# Patient Record
Sex: FEMALE | Race: WHITE | NOT HISPANIC OR LATINO
[De-identification: names, ages, dates, MRNs, and addresses within clinical notes are randomized per-mention and may not be internally consistent; named-entity substitution may affect disease eponyms.]

---

## 2017-06-27 ENCOUNTER — NON-APPOINTMENT (OUTPATIENT)
Age: 22
End: 2017-06-27

## 2017-06-27 ENCOUNTER — APPOINTMENT (OUTPATIENT)
Dept: CARDIOLOGY | Facility: CLINIC | Age: 22
End: 2017-06-27

## 2017-06-27 VITALS
OXYGEN SATURATION: 100 % | SYSTOLIC BLOOD PRESSURE: 141 MMHG | HEART RATE: 73 BPM | WEIGHT: 231 LBS | BODY MASS INDEX: 34.21 KG/M2 | DIASTOLIC BLOOD PRESSURE: 86 MMHG | HEIGHT: 69 IN

## 2017-06-27 DIAGNOSIS — Z84.89 FAMILY HISTORY OF OTHER SPECIFIED CONDITIONS: ICD-10-CM

## 2017-07-06 ENCOUNTER — APPOINTMENT (OUTPATIENT)
Dept: CARDIOLOGY | Facility: CLINIC | Age: 22
End: 2017-07-06

## 2017-07-06 ENCOUNTER — MEDICATION RENEWAL (OUTPATIENT)
Age: 22
End: 2017-07-06

## 2017-07-18 ENCOUNTER — APPOINTMENT (OUTPATIENT)
Dept: CARDIOLOGY | Facility: CLINIC | Age: 22
End: 2017-07-18

## 2017-07-28 ENCOUNTER — OTHER (OUTPATIENT)
Age: 22
End: 2017-07-28

## 2017-07-28 DIAGNOSIS — R00.2 PALPITATIONS: ICD-10-CM

## 2017-08-18 ENCOUNTER — APPOINTMENT (OUTPATIENT)
Dept: OBGYN | Facility: CLINIC | Age: 22
End: 2017-08-18
Payer: COMMERCIAL

## 2017-08-18 VITALS
WEIGHT: 234 LBS | SYSTOLIC BLOOD PRESSURE: 126 MMHG | DIASTOLIC BLOOD PRESSURE: 80 MMHG | BODY MASS INDEX: 34.66 KG/M2 | HEIGHT: 69 IN

## 2017-08-18 PROCEDURE — 99395 PREV VISIT EST AGE 18-39: CPT

## 2017-08-28 LAB
C TRACH RRNA SPEC QL NAA+PROBE: NORMAL
CYTOLOGY CVX/VAG DOC THIN PREP: NORMAL
N GONORRHOEA RRNA SPEC QL NAA+PROBE: NORMAL
SOURCE TP AMPLIFICATION: NORMAL

## 2018-08-21 ENCOUNTER — APPOINTMENT (OUTPATIENT)
Dept: OBGYN | Facility: CLINIC | Age: 23
End: 2018-08-21
Payer: COMMERCIAL

## 2018-08-21 VITALS
BODY MASS INDEX: 34.66 KG/M2 | HEIGHT: 69 IN | DIASTOLIC BLOOD PRESSURE: 60 MMHG | SYSTOLIC BLOOD PRESSURE: 120 MMHG | WEIGHT: 234 LBS

## 2018-08-21 DIAGNOSIS — Z00.00 ENCOUNTER FOR GENERAL ADULT MEDICAL EXAMINATION W/OUT ABNORMAL FINDINGS: ICD-10-CM

## 2018-08-21 PROCEDURE — 99395 PREV VISIT EST AGE 18-39: CPT

## 2018-08-24 LAB
C TRACH RRNA SPEC QL NAA+PROBE: NOT DETECTED
CYTOLOGY CVX/VAG DOC THIN PREP: NORMAL
N GONORRHOEA RRNA SPEC QL NAA+PROBE: NOT DETECTED
SOURCE TP AMPLIFICATION: NORMAL

## 2019-09-10 ENCOUNTER — APPOINTMENT (OUTPATIENT)
Dept: OBGYN | Facility: CLINIC | Age: 24
End: 2019-09-10
Payer: COMMERCIAL

## 2019-09-10 VITALS
WEIGHT: 180 LBS | HEIGHT: 69 IN | DIASTOLIC BLOOD PRESSURE: 70 MMHG | SYSTOLIC BLOOD PRESSURE: 120 MMHG | BODY MASS INDEX: 26.66 KG/M2

## 2019-09-10 DIAGNOSIS — N94.6 DYSMENORRHEA, UNSPECIFIED: ICD-10-CM

## 2019-09-10 PROCEDURE — 99395 PREV VISIT EST AGE 18-39: CPT

## 2019-09-10 RX ORDER — NORGESTIMATE AND ETHINYL ESTRADIOL 0.25-0.035
0.25-35 KIT ORAL DAILY
Qty: 90 | Refills: 3 | Status: ACTIVE | COMMUNITY
Start: 2019-07-30 | End: 1900-01-01

## 2019-09-10 NOTE — HISTORY OF PRESENT ILLNESS
[1 Year Ago] : 1 year ago [Good] : being in good health [Regular Exercise] : regular exercise [Healthy Diet] : a healthy diet [Weight Concerns] : no concerns with her weight [Menstrual Problems] : reports normal menses [Up to Date] : not up to date with ~his/her~ STD screening [Sexually Active] : is not sexually active

## 2019-09-10 NOTE — PHYSICAL EXAM
[Awake] : awake [Alert] : alert [Acute Distress] : no acute distress [LAD] : no lymphadenopathy [Goiter] : no goiter [Thyroid Nodule] : no thyroid nodule [Mass] : no breast mass [Nipple Discharge] : no nipple discharge [Soft] : soft [Axillary LAD] : no axillary lymphadenopathy [Tender] : non tender [Oriented x3] : oriented to person, place, and time [Labia Majora] : labia major [Labia Minora] : labia minora [Normal] : clitoris [No Bleeding] : there was no active vaginal bleeding [Pap Obtained] : a Pap smear was performed [Uterine Adnexae] : were not tender and not enlarged

## 2019-09-11 ENCOUNTER — TRANSCRIPTION ENCOUNTER (OUTPATIENT)
Age: 24
End: 2019-09-11

## 2019-09-12 LAB
C TRACH RRNA SPEC QL NAA+PROBE: NOT DETECTED
N GONORRHOEA RRNA SPEC QL NAA+PROBE: NOT DETECTED
SOURCE TP AMPLIFICATION: NORMAL

## 2019-09-14 LAB — CYTOLOGY CVX/VAG DOC THIN PREP: NORMAL

## 2019-11-15 ENCOUNTER — TRANSCRIPTION ENCOUNTER (OUTPATIENT)
Age: 24
End: 2019-11-15

## 2019-12-11 ENCOUNTER — TRANSCRIPTION ENCOUNTER (OUTPATIENT)
Age: 24
End: 2019-12-11

## 2020-01-23 RX ORDER — NORGESTIMATE AND ETHINYL ESTRADIOL 0.25-0.035
0.25-35 KIT ORAL
Qty: 3 | Refills: 3 | Status: ACTIVE | COMMUNITY
Start: 2020-01-23 | End: 1900-01-01

## 2021-01-14 ENCOUNTER — APPOINTMENT (OUTPATIENT)
Dept: OBGYN | Facility: CLINIC | Age: 26
End: 2021-01-14
Payer: COMMERCIAL

## 2021-01-14 VITALS
HEIGHT: 69 IN | SYSTOLIC BLOOD PRESSURE: 126 MMHG | DIASTOLIC BLOOD PRESSURE: 80 MMHG | HEART RATE: 91 BPM | WEIGHT: 243 LBS | BODY MASS INDEX: 35.99 KG/M2

## 2021-01-14 DIAGNOSIS — Z11.3 ENCOUNTER FOR SCREENING FOR INFECTIONS WITH A PREDOMINANTLY SEXUAL MODE OF TRANSMISSION: ICD-10-CM

## 2021-01-14 DIAGNOSIS — Z01.419 ENCOUNTER FOR GYNECOLOGICAL EXAMINATION (GENERAL) (ROUTINE) W/OUT ABNORMAL FINDINGS: ICD-10-CM

## 2021-01-14 PROCEDURE — 99395 PREV VISIT EST AGE 18-39: CPT

## 2021-01-14 NOTE — HISTORY OF PRESENT ILLNESS
[FreeTextEntry1] : 26 yo here for av. She has been on micaela for 14 months and she is c/o for the past 1 month feeling lake and just not herself. She saw her pcp recently who dis full blood work,all normal. She has never been sexually active in the past. She takes this to help bring a period on. She would skip a period every other month.\par \par She had gardail vac.\par She is a teacher.

## 2021-01-14 NOTE — DISCUSSION/SUMMARY
[FreeTextEntry1] : Well woman visit\par no pap needed\par OCP  discontinued- pt would like to come off and see if her sx are due to the hormones. We can cycle with provera if needed.\par rt in 1 year\par

## 2021-01-14 NOTE — PHYSICAL EXAM
[Appropriately responsive] : appropriately responsive [Alert] : alert [No Acute Distress] : no acute distress [No Lymphadenopathy] : no lymphadenopathy [Regular Rate Rhythm] : regular rate rhythm [No Murmurs] : no murmurs [Clear to Auscultation B/L] : clear to auscultation bilaterally [Soft] : soft [Non-tender] : non-tender [Non-distended] : non-distended [No HSM] : No HSM [No Mass] : no mass [No Lesions] : no lesions [Oriented x3] : oriented x3 [Examination Of The Breasts] : a normal appearance [No Masses] : no breast masses were palpable [Labia Majora] : normal [Labia Minora] : normal [Normal] : normal [Uterine Adnexae] : normal

## 2021-02-20 ENCOUNTER — TRANSCRIPTION ENCOUNTER (OUTPATIENT)
Age: 26
End: 2021-02-20

## 2023-03-20 ENCOUNTER — APPOINTMENT (OUTPATIENT)
Dept: OBGYN | Facility: CLINIC | Age: 28
End: 2023-03-20

## 2023-05-27 ENCOUNTER — NON-APPOINTMENT (OUTPATIENT)
Age: 28
End: 2023-05-27

## 2023-10-19 ENCOUNTER — EMERGENCY (EMERGENCY)
Facility: HOSPITAL | Age: 28
LOS: 1 days | Discharge: HOME | End: 2023-10-19
Attending: EMERGENCY MEDICINE | Admitting: EMERGENCY MEDICINE
Payer: COMMERCIAL

## 2023-10-19 VITALS
WEIGHT: 229.94 LBS | DIASTOLIC BLOOD PRESSURE: 87 MMHG | HEIGHT: 69 IN | HEART RATE: 73 BPM | RESPIRATION RATE: 16 BRPM | SYSTOLIC BLOOD PRESSURE: 152 MMHG | OXYGEN SATURATION: 100 % | TEMPERATURE: 98 F

## 2023-10-19 DIAGNOSIS — M72.2 PLANTAR FASCIAL FIBROMATOSIS: ICD-10-CM

## 2023-10-19 LAB
HCG UR QL: NEGATIVE — SIGNIFICANT CHANGE UP
HCG UR QL: NEGATIVE — SIGNIFICANT CHANGE UP

## 2023-10-19 PROCEDURE — 99284 EMERGENCY DEPT VISIT MOD MDM: CPT | Mod: 25

## 2023-10-19 PROCEDURE — 81025 URINE PREGNANCY TEST: CPT

## 2023-10-19 PROCEDURE — 73630 X-RAY EXAM OF FOOT: CPT | Mod: 26,RT

## 2023-10-19 PROCEDURE — 99284 EMERGENCY DEPT VISIT MOD MDM: CPT

## 2023-10-19 PROCEDURE — 73630 X-RAY EXAM OF FOOT: CPT

## 2023-10-19 NOTE — ED ADULT NURSE NOTE - NSFALLUNIVINTERV_ED_ALL_ED
Bed/Stretcher in lowest position, wheels locked, appropriate side rails in place/Call bell, personal items and telephone in reach/Instruct patient to call for assistance before getting out of bed/chair/stretcher/Non-slip footwear applied when patient is off stretcher/[place] to call system/Physically safe environment - no spills, clutter or unnecessary equipment/Purposeful proactive rounding/Room/bathroom lighting operational, light cord in reach

## 2023-10-19 NOTE — ED PROVIDER NOTE - PATIENT PORTAL LINK FT
You can access the FollowMyHealth Patient Portal offered by [facility] by registering at the following website: [email or web address]. By joining [employer]’s FollowMyHealth portal, you will also be able to view your health information using other applications (apps) compatible with our system.

## 2023-10-19 NOTE — ED PROVIDER NOTE - DIFFERENTIAL DIAGNOSIS
Differential Diagnosis Differential including but not limited to fracture dislocation sprain exacerbation of plantar fasciitis

## 2023-10-19 NOTE — ED PROVIDER NOTE - CARE PROVIDER_API CALL
Chapito Colorado  Podiatric Medicine and Surgery  [address] [place] [address]  Phone: [phone]  Fax: [phone]  Follow Up Time: 1-3 Days

## 2023-10-19 NOTE — ED ADULT NURSE NOTE - CHIEF COMPLAINT QUOTE
Patient is a 29yo female right foot pain Patient has a history of Plantar fasciitis Patient states it started to hurt at 0530 am and doesn't feel like her regular plantar pain

## 2023-10-19 NOTE — ED PROVIDER NOTE - CLINICAL SUMMARY MEDICAL DECISION MAKING FREE TEXT BOX
Patient complaining of severe right foot pain which began while running at [employer] today.  Patient relates has history of plantar fasciitis however this pain is more severe.  Patient called her podiatrist however they cannot see her till the end of next week so she came to the ER.  Patient took NSAID prior to arrival.    Plan [facility] x-ray right foot Percocet for pain    Differential including but not limited to fracture dislocation sprain exacerbation of plantar fasciitis

## 2023-10-19 NOTE — ED PROVIDER NOTE - NSFOLLOWUPINSTRUCTIONS_ED_ALL_ED_FT
Foot Pain  Many things can cause foot pain. Some common causes are:  An injury.  A sprain.  Arthritis.  Blisters.  Bunions.  Follow these instructions at home:  Managing pain, stiffness, and swelling    Bag of ice on a towel on the skin.  If directed, put ice on the painful area:  Put ice in a plastic bag.  Place a towel between your skin and the bag.  Leave the ice on for 20 minutes, 2–3 times a day.  Activity    Do not stand or walk for long periods.  Return to your normal activities as told by your health care provider. Ask your health care provider what activities are safe for you.  Do stretches to relieve foot pain and stiffness as told by your health care provider.  Do not lift anything that is heavier than 10 lb (4.5 kg), or the limit that you are told, until your health care provider says that it is safe. Lifting a lot of weight can put added pressure on your feet.  Lifestyle    Wear comfortable, supportive shoes that fit you well. Do not wear high heels.  Keep your feet clean and dry.  General instructions    Take over-the-counter and prescription medicines only as told by your health care provider.  Rub your foot gently.  Pay attention to any changes in your symptoms.  Keep all follow-up visits as told by your health care provider. This is important.  Contact a health care provider if:  Your pain does not get better after a few days of self-care.  Your pain gets worse.  You cannot stand on your foot.  Get help right away if:  Your foot is numb or tingling.  Your foot or toes are swollen.  Your foot or toes turn white or blue.  You have warmth and redness along your foot.  Summary  Common causes of foot pain are injury, sprain, arthritis, blisters, or bunions.  Ice, medicines, and comfortable shoes may help foot pain.  Contact your health care provider if your pain does not get better after a few days of self-care.  This information is not intended to replace advice given to you by your health care provider. Make sure you discuss any questions you have with your health care provider.

## 2023-10-19 NOTE — ED ADULT TRIAGE NOTE - CHIEF COMPLAINT QUOTE
Patient is a 27yo female right foot pain Patient has a history of Plantar fasciitis Patient states it started to hurt at 0530 am and doesn't feel like her regular plantar pain

## 2023-10-19 NOTE — ED ADULT NURSE NOTE - OBJECTIVE STATEMENT
pt to ED A&Ox4 with complaints of  right foot pain x 12 hrs and difficulty bearing weight. reports difficulty bearing weight on RLE.

## 2023-10-19 NOTE — CONSULT NOTE ADULT - SUBJECTIVE AND OBJECTIVE BOX
Patient is a 28y old  Female who presents with a chief complaint of     HPI:      PAST MEDICAL & SURGICAL HISTORY:      MEDICATIONS  (STANDING):    MEDICATIONS  (PRN):      Allergies    Ceclor (Rash)    Intolerances        VITALS:    Vital Signs Last 24 Hrs  T(C): 36.4 (19 Oct 2023 15:59), Max: 36.4 (19 Oct 2023 15:59)  T(F): 97.5 (19 Oct 2023 15:59), Max: 97.5 (19 Oct 2023 15:59)  HR: 73 (19 Oct 2023 15:59) (73 - 73)  BP: 152/87 (19 Oct 2023 15:59) (152/87 - 152/87)  BP(mean): --  RR: 16 (19 Oct 2023 15:59) (16 - 16)  SpO2: 100% (19 Oct 2023 15:59) (100% - 100%)    Parameters below as of 19 Oct 2023 15:59  Patient On (Oxygen Delivery Method): room air        LABS:                CAPILLARY BLOOD GLUCOSE              LOWER EXTREMITY PHYSICAL EXAM:    Vasular: DP/PT palpable B/L, Capillary fill time <3 seconds B/L    Neuro: Protective sensation intact B/L    Wound #1:   Location:  Size:  Depth:  Wound bed:   Drainage:   Odor:   Periwound:  Etiology:     RADIOLOGY & ADDITIONAL STUDIES:    A/P: Patient is a 28y old  Female who presents with a chief complaint of            Patient is a 28y old  Female who presents with a chief complaint of right heel pain.  Patient states she has a history of plantar fasciitis, and works out at [employer].  Patient states she took a few days off to let her pain calm down. Patient state she went back today and was running, where she felt searing pain at her heel and was unable to walk.    HPI: Patient complaining of severe right foot pain which began while running at [employer] today.  Patient relates has history of plantar fasciitis however this pain is more severe.  Patient called her podiatrist however they cannot see her till the end of next week so she came to the ER.  Patient took NSAID prior to arrival.      PAST MEDICAL & SURGICAL HISTORY:      MEDICATIONS  (STANDING):    MEDICATIONS  (PRN):      Allergies    Ceclor (Rash)    Intolerances        VITALS:    Vital Signs Last 24 Hrs  T(C): 36.4 (19 Oct 2023 15:59), Max: 36.4 (19 Oct 2023 15:59)  T(F): 97.5 (19 Oct 2023 15:59), Max: 97.5 (19 Oct 2023 15:59)  HR: 73 (19 Oct 2023 15:59) (73 - 73)  BP: 152/87 (19 Oct 2023 15:59) (152/87 - 152/87)  BP(mean): --  RR: 16 (19 Oct 2023 15:59) (16 - 16)  SpO2: 100% (19 Oct 2023 15:59) (100% - 100%)    Parameters below as of 19 Oct 2023 15:59  Patient On (Oxygen Delivery Method): room air        LABS:                CAPILLARY BLOOD GLUCOSE              LOWER EXTREMITY PHYSICAL EXAM:    Vasular: DP/PT palpable B/L, Capillary fill time <3 seconds B/L  Neuro: Protective sensation intact B/L  Derm: Skin is warm, dry, and supple b/l.  No breaks in the integument  MSK: 5/5 muscle strength noted in all compartments b/l.  Pain upon light palpation of insertion of plantar fascia right foot.  Negative tinels sign.  Pain upon dorsiflexion of right ankle joint, no pain upon plantarflexion right ankle joint.             Patient is a 28y old  Female who presents with a chief complaint of right heel pain.  Patient states she has a history of plantar fasciitis, and works out at [employer].  Patient states she took a few days off to let her pain calm down. Patient state she went back today and was running, where she felt searing pain at her heel and was unable to walk.    HPI: Patient complaining of severe right foot pain which began while running at [employer] today.  Patient relates has history of plantar fasciitis however this pain is more severe.  Patient called her podiatrist however they cannot see her till the end of next week so she came to the ER.  Patient took NSAID prior to arrival.      PAST MEDICAL & SURGICAL HISTORY:      MEDICATIONS  (STANDING):    MEDICATIONS  (PRN):      Allergies    Ceclor (Rash)    Intolerances        VITALS:    Vital Signs Last 24 Hrs  T(C): 36.4 (19 Oct 2023 15:59), Max: 36.4 (19 Oct 2023 15:59)  T(F): 97.5 (19 Oct 2023 15:59), Max: 97.5 (19 Oct 2023 15:59)  HR: 73 (19 Oct 2023 15:59) (73 - 73)  BP: 152/87 (19 Oct 2023 15:59) (152/87 - 152/87)  BP(mean): --  RR: 16 (19 Oct 2023 15:59) (16 - 16)  SpO2: 100% (19 Oct 2023 15:59) (100% - 100%)    Parameters below as of 19 Oct 2023 15:59  Patient On (Oxygen Delivery Method): room air                LOWER EXTREMITY PHYSICAL EXAM:    Vasular: DP/PT palpable B/L, Capillary fill time <3 seconds B/L  Neuro: Protective sensation intact B/L  Derm: Skin is warm, dry, and supple b/l.  No breaks in the integument  MSK: 5/5 muscle strength noted in all compartments b/l.  Pain upon light palpation of insertion of plantar fascia right foot.  Negative tinels sign.  Pain upon dorsiflexion of right ankle joint, no pain upon plantarflexion right ankle joint.

## 2023-10-19 NOTE — CONSULT NOTE ADULT - PROBLEM SELECTOR RECOMMENDATION 9
Patient seen and evaluated.  Family present with patient  Discussed condition with patient  Questions and concerns answered to satisfaction  Xrays right foot ordered and performed, revealing negative signs of acute stress fx of right calcaneus  Patient advised to be nwb to right foot, rec use of crutches  Rec ice to right foot  Rec elevation right lower extremity  Patient advised to f/u with Dr. Chapito [place] in [place] 3-5 days post discharge from hospital.  Please call [phone] to make an appointment.  Please keep dressings clean, dry, and intact until f/u in clinic.    Will discuss with all attendings  Thank you for consult

## 2023-10-19 NOTE — ED PROVIDER NOTE - OBJECTIVE STATEMENT
Patient complaining of severe right foot pain which began while running at [employer] today.  Patient relates has history of plantar fasciitis however this pain is more severe.  Patient called her podiatrist however they cannot see her till the end of next week so she came to the ER.  Patient took NSAID prior to arrival.

## 2024-09-11 ENCOUNTER — NON-APPOINTMENT (OUTPATIENT)
Age: 29
End: 2024-09-11

## 2025-01-04 ENCOUNTER — NON-APPOINTMENT (OUTPATIENT)
Age: 30
End: 2025-01-04

## 2025-01-28 ENCOUNTER — NON-APPOINTMENT (OUTPATIENT)
Age: 30
End: 2025-01-28

## 2025-05-18 ENCOUNTER — NON-APPOINTMENT (OUTPATIENT)
Age: 30
End: 2025-05-18

## 2025-05-21 ENCOUNTER — NON-APPOINTMENT (OUTPATIENT)
Age: 30
End: 2025-05-21

## 2025-06-30 ENCOUNTER — NON-APPOINTMENT (OUTPATIENT)
Age: 30
End: 2025-06-30